# Patient Record
Sex: MALE | Race: OTHER | URBAN - NONMETROPOLITAN AREA
[De-identification: names, ages, dates, MRNs, and addresses within clinical notes are randomized per-mention and may not be internally consistent; named-entity substitution may affect disease eponyms.]

---

## 2017-09-19 NOTE — PROCEDURE NOTE: CLINICAL
PROCEDURE NOTE: Eylea #8 OD. Diagnosis: Diabetic Macular Edema. Prior to injection, risks/benefits/alternatives discussed including corneal abrasion, infection, loss of vision, hemorrhage, cataract, glaucoma, retinal tears or detachment. A written consent is on file, and the need for today’s injection was discussed and the patient is understanding and wishes to proceed. The entire vial of Eylea was drawn up into a syringe. The opened vial, remaining drug, and filtered needle were disposed of in a certified biohazard container. Betadine prep was performed. Topical anesthesia was induced with Alcaine. 4% lidocaine pledge. A lid speculum was used. A short 30g needle on a 1cc syringe was used with product that that had previously been prepared under sterile conditions. Injection site: 3-4 mm from the limbus. The used syringe/needle was transferred to a biohazard container. Lid speculum removed. Mask worn during procedure. Patient tolerated procedure well. Count fingers vision was verified. There were no complications. Patient was given the standard instruction sheet. Patient given office phone number/answering service number and advised to call immediately should there be loss of vision or pain, or should they have any other questions or concerns. Nish Coelho

## 2017-10-03 NOTE — PROCEDURE NOTE: CLINICAL
PROCEDURE NOTE: Eylea #6 OS. Diagnosis: Diabetic Macular Edema. Prep: Betadine Drops and Betadine Scrub. Prior to injection, risks/benefits/alternatives discussed including corneal abrasion, infection, loss of vision, hemorrhage, cataract, glaucoma, retinal tears or detachment. A written consent is on file, and the need for today’s injection was discussed and the patient is understanding and wishes to proceed. The entire vial of Eylea was drawn up into a syringe. The opened vial, remaining drug, and filtered needle were disposed of in a certified biohazard container. Betadine prep was performed. Topical anesthesia was induced with Alcaine. 4% lidocaine pledge. A lid speculum was used. A short 30g needle on a 1cc syringe was used with product that that had previously been prepared under sterile conditions. Injection site: 3-4 mm from the limbus. The used syringe/needle was transferred to a biohazard container. Lid speculum removed. Mask worn during procedure. Patient tolerated procedure well. Count fingers vision was verified. There were no complications. Patient was given the standard instruction sheet. Patient given office phone number/answering service number and advised to call immediately should there be loss of vision or pain, or should they have any other questions or concerns. Tono Richter PROCEDURE NOTE: Focal Laser, Retina #1 OD. Diagnosis: Diabetic Macular Edema. Anesthesia: Topical. Prior to focal laser, risks/benefits/alternatives to laser discussed including loss of vision and patient wished to proceed. Spot size: * um. Power: * mW. Number of pulses: *. Patient tolerated procedure well. There were no complications. Post procedure instructions given. Tono Richter

## 2017-10-03 NOTE — PATIENT DISCUSSION
Recc that patient have Focal Laser today for the diabetic Retinopathy. R & B discussed in detail. Pt elects to proceed. Insurance ok Per [employer]. Will keep scheduled Eylea # 9 on 10-17. Advised pt to call with any [facility] changes.

## 2017-10-03 NOTE — PATIENT DISCUSSION
Recommended Eylea # 6 injection today. The injection was given and tolerated well by patient. Post-injection instructions were reviewed and understood by the patient.

## 2017-11-07 NOTE — PROCEDURE NOTE: CLINICAL
PROCEDURE NOTE: Eylea #9 OD. Diagnosis: Diabetic Macular Edema. Prep: Betadine Drops and Betadine Scrub. Prior to injection, risks/benefits/alternatives discussed including corneal abrasion, infection, loss of vision, hemorrhage, cataract, glaucoma, retinal tears or detachment. A written consent is on file, and the need for today’s injection was discussed and the patient is understanding and wishes to proceed. The entire vial of Eylea was drawn up into a syringe. The opened vial, remaining drug, and filtered needle were disposed of in a certified biohazard container. Betadine prep was performed. Topical anesthesia was induced with Alcaine. 4% lidocaine pledge. A lid speculum was used. A short 30g needle on a 1cc syringe was used with product that that had previously been prepared under sterile conditions. Injection site: 3-4 mm from the limbus. The used syringe/needle was transferred to a biohazard container. Lid speculum removed. Mask worn during procedure. Patient tolerated procedure well. Count fingers vision was verified. There were no complications. Patient was given the standard instruction sheet. Patient given office phone number/answering service number and advised to call immediately should there be loss of vision or pain, or should they have any other questions or concerns. Tono Richter PROCEDURE NOTE: Eylea #7 OS. Diagnosis: Diabetic Macular Edema. Prep: Betadine Drops and Betadine Scrub. Prior to injection, risks/benefits/alternatives discussed including corneal abrasion, infection, loss of vision, hemorrhage, cataract, glaucoma, retinal tears or detachment. A written consent is on file, and the need for today’s injection was discussed and the patient is understanding and wishes to proceed. The entire vial of Eylea was drawn up into a syringe. The opened vial, remaining drug, and filtered needle were disposed of in a certified biohazard container. Betadine prep was performed. Topical anesthesia was induced with Alcaine. 4% lidocaine pledge. A lid speculum was used. A short 30g needle on a 1cc syringe was used with product that that had previously been prepared under sterile conditions. Injection site: 3-4 mm from the limbus. The used syringe/needle was transferred to a biohazard container. Lid speculum removed. Mask worn during procedure. Patient tolerated procedure well. Count fingers vision was verified. There were no complications. Patient was given the standard instruction sheet. Patient given office phone number/answering service number and advised to call immediately should there be loss of vision or pain, or should they have any other questions or concerns. Tono Richter

## 2017-12-06 NOTE — PATIENT DISCUSSION
Pt edu due to high tendency to return [facility] [address] Eylea # 8 today. Pt elects to proceed.

## 2017-12-06 NOTE — PROCEDURE NOTE: CLINICAL
PROCEDURE NOTE: Eylea #10 OD. Diagnosis: Diabetic Macular Edema. Prep: Betadine Drops and Betadine Scrub. Prior to injection, risks/benefits/alternatives discussed including corneal abrasion, infection, loss of vision, hemorrhage, cataract, glaucoma, retinal tears or detachment. A written consent is on file, and the need for today’s injection was discussed and the patient is understanding and wishes to proceed. The entire vial of Eylea was drawn up into a syringe. The opened vial, remaining drug, and filtered needle were disposed of in a certified biohazard container. Betadine prep was performed. Topical anesthesia was induced with Alcaine. 4% lidocaine pledge. A lid speculum was used. A short 30g needle on a 1cc syringe was used with product that that had previously been prepared under sterile conditions. Injection site: 3-4 mm from the limbus. The used syringe/needle was transferred to a biohazard container. Lid speculum removed. Mask worn during procedure. Patient tolerated procedure well. Count fingers vision was verified. There were no complications. Patient was given the standard instruction sheet. Patient given office phone number/answering service number and advised to call immediately should there be loss of vision or pain, or should they have any other questions or concerns. Onel Trippmanny PROCEDURE NOTE: Eylea #8 OS. Diagnosis: Diabetic Macular Edema. Prep: Betadine Drops and Betadine Scrub. Prior to injection, risks/benefits/alternatives discussed including corneal abrasion, infection, loss of vision, hemorrhage, cataract, glaucoma, retinal tears or detachment. A written consent is on file, and the need for today’s injection was discussed and the patient is understanding and wishes to proceed. The entire vial of Eylea was drawn up into a syringe. The opened vial, remaining drug, and filtered needle were disposed of in a certified biohazard container. Betadine prep was performed. Topical anesthesia was induced with Alcaine. 4% lidocaine pledge. A lid speculum was used. A short 30g needle on a 1cc syringe was used with product that that had previously been prepared under sterile conditions. Injection site: 3-4 mm from the limbus. The used syringe/needle was transferred to a biohazard container. Lid speculum removed. Mask worn during procedure. Patient tolerated procedure well. Count fingers vision was verified. There were no complications. Patient was given the standard instruction sheet. Patient given office phone number/answering service number and advised to call immediately should there be loss of vision or pain, or should they have any other questions or concerns. Onel Villareal

## 2017-12-20 NOTE — PROCEDURE NOTE: CLINICAL
PROCEDURE NOTE: Focal Laser, Retina #1 OS. Diagnosis: Diabetic Macular Edema. Prior to focal laser, risks/benefits/alternatives to laser discussed including loss of vision and patient wished to proceed. Spot size: 100 um. Power: 130 mW. Number of pulses: 40. Patient tolerated procedure well. There were no complications. Post procedure instructions given. Gay Nava

## 2017-12-20 NOTE — PATIENT DISCUSSION
recs focal laser #1 today dicussed risks, benefits and advantages pt elects to proceed with focal laser OS today. rtc as scheduled in Jan(5weeks) for [facility] [place] /eylea OU/OCT.

## 2018-01-09 NOTE — PATIENT DISCUSSION
Recommended injection of Eylea # 11. The injection was given and tolerated well by patient. Post-injection instructions were reviewed and understood by the patient.

## 2018-01-09 NOTE — PATIENT DISCUSSION
Recommended Eylea # 9 injection today. The injection was given and tolerated well by patient. Post-injection instructions were reviewed and understood by the patient.

## 2018-01-09 NOTE — PATIENT DISCUSSION
Pt edu slightly better. Has high tendency to return. Rec Adrian # 9 today. Pt elects to proceed. Will follow up in 4 weeks for BILATERAL TREATMENT ONLY PER TPB.

## 2018-01-09 NOTE — PATIENT DISCUSSION
Decision to treat was made based on today's clinical findings. Pt edu slightly worse today. Has high tendency to return. In 2 weeks also recc Focal Laser only for right eye. Then in 4 weeks will follow up for Bilateral TREATMENT ONLY PER TPB.

## 2018-01-09 NOTE — PROCEDURE NOTE: CLINICAL
PROCEDURE NOTE: Eylea #11 OD. Diagnosis: Diabetic Macular Edema. Prep: Betadine Drops and Betadine Scrub. Prior to injection, risks/benefits/alternatives discussed including corneal abrasion, infection, loss of vision, hemorrhage, cataract, glaucoma, retinal tears or detachment. A written consent is on file, and the need for today’s injection was discussed and the patient is understanding and wishes to proceed. The entire vial of Eylea was drawn up into a syringe. The opened vial, remaining drug, and filtered needle were disposed of in a certified biohazard container. Betadine prep was performed. Topical anesthesia was induced with Alcaine. 4% lidocaine pledge. A lid speculum was used. A short 30g needle on a 1cc syringe was used with product that that had previously been prepared under sterile conditions. Injection site: 3-4 mm from the limbus. The used syringe/needle was transferred to a biohazard container. Lid speculum removed. Mask worn during procedure. Patient tolerated procedure well. Count fingers vision was verified. There were no complications. Patient was given the standard instruction sheet. Patient given office phone number/answering service number and advised to call immediately should there be loss of vision or pain, or should they have any other questions or concerns. [facility] PROCEDURE NOTE: Eylea #9 OS. Diagnosis: Diabetic Macular Edema. Prep: Betadine Drops and Betadine Scrub. Prior to injection, risks/benefits/alternatives discussed including corneal abrasion, infection, loss of vision, hemorrhage, cataract, glaucoma, retinal tears or detachment. A written consent is on file, and the need for today’s injection was discussed and the patient is understanding and wishes to proceed. The entire vial of Eylea was drawn up into a syringe. The opened vial, remaining drug, and filtered needle were disposed of in a certified biohazard container. Betadine prep was performed. Topical anesthesia was induced with Alcaine. 4% lidocaine pledge. A lid speculum was used. A short 30g needle on a 1cc syringe was used with product that that had previously been prepared under sterile conditions. Injection site: 3-4 mm from the limbus. The used syringe/needle was transferred to a biohazard container. Lid speculum removed. Mask worn during procedure. Patient tolerated procedure well. Count fingers vision was verified. There were no complications. Patient was given the standard instruction sheet. Patient given office phone number/answering service number and advised to call immediately should there be loss of vision or pain, or should they have any other questions or concerns. Rosalia Bowman

## 2018-01-24 NOTE — PROCEDURE NOTE: CLINICAL
PROCEDURE NOTE: Focal Laser, Retina #2 OD. Diagnosis: Diabetic Macular Edema. Anesthesia: Topical. Prior to focal laser, risks/benefits/alternatives to laser discussed including loss of vision and patient wished to proceed. Spot size: 300 um. Power: 110 mW. Number of pulses: 20. Patient tolerated procedure well. There were no complications. Post procedure instructions given. Trevor Clemons

## 2018-01-24 NOTE — PATIENT DISCUSSION
recc Focal Laser only for right eye tx only today R/B/As disc with pt, opt elects to proceed. has appt scheduled in 2 weeks for Bilateral TREATMENT ONLY PER TPB.

## 2018-02-06 NOTE — PATIENT DISCUSSION
Recommended Eylea #10 injection today, BILATERAL. The patient elects to proceed. The injection was administered and tolerated well by patient with no complications. Post-injection instructions were reviewed and understood by the patient.

## 2018-02-06 NOTE — PROCEDURE NOTE: CLINICAL
PROCEDURE NOTE: Eylea #12 OD. Diagnosis: Diabetic Macular Edema. Prior to injection, risks/benefits/alternatives discussed including corneal abrasion, infection, loss of vision, hemorrhage, cataract, glaucoma, retinal tears or detachment. A written consent is on file, and the need for today’s injection was discussed and the patient is understanding and wishes to proceed. The entire vial of Eylea was drawn up into a syringe. The opened vial, remaining drug, and filtered needle were disposed of in a certified biohazard container. Betadine prep was performed. Topical anesthesia was induced with Alcaine. 4% lidocaine pledge. A lid speculum was used. A short 30g needle on a 1cc syringe was used with product that that had previously been prepared under sterile conditions. Injection site: 3-4 mm from the limbus. The used syringe/needle was transferred to a biohazard container. Lid speculum removed. Mask worn during procedure. Patient tolerated procedure well. Count fingers vision was verified. There were no complications. Patient was given the standard instruction sheet. Patient given office phone number/answering service number and advised to call immediately should there be loss of vision or pain, or should they have any other questions or concerns. Alexa Diego PROCEDURE NOTE: Eylea #10 OS. Diagnosis: Diabetic Macular Edema. Prior to injection, risks/benefits/alternatives discussed including corneal abrasion, infection, loss of vision, hemorrhage, cataract, glaucoma, retinal tears or detachment. A written consent is on file, and the need for today’s injection was discussed and the patient is understanding and wishes to proceed. The entire vial of Eylea was drawn up into a syringe. The opened vial, remaining drug, and filtered needle were disposed of in a certified biohazard container. Betadine prep was performed. Topical anesthesia was induced with Alcaine. 4% lidocaine pledge. A lid speculum was used. A short 30g needle on a 1cc syringe was used with product that that had previously been prepared under sterile conditions. Injection site: 3-4 mm from the limbus. The used syringe/needle was transferred to a biohazard container. Lid speculum removed. Mask worn during procedure. Patient tolerated procedure well. Count fingers vision was verified. There were no complications. Patient was given the standard instruction sheet. Patient given office phone number/answering service number and advised to call immediately should there be loss of vision or pain, or should they have any other questions or concerns. Alexa Diego

## 2018-02-06 NOTE — PATIENT DISCUSSION
Recommended Eylea #12 injection today, BILATERAL. The patient elects to proceed. The injection was administered and tolerated well by patient with no complications. Post-injection instructions were reviewed and understood by the patient.

## 2018-03-06 NOTE — PROCEDURE NOTE: CLINICAL
PROCEDURE NOTE: Eylea #13 OD. Diagnosis: Diabetic Macular Edema. Prep: Betadine Drops and Betadine Scrub. Prior to injection, risks/benefits/alternatives discussed including corneal abrasion, infection, loss of vision, hemorrhage, cataract, glaucoma, retinal tears or detachment. A written consent is on file, and the need for today’s injection was discussed and the patient is understanding and wishes to proceed. The entire vial of Eylea was drawn up into a syringe. The opened vial, remaining drug, and filtered needle were disposed of in a certified biohazard container. Betadine prep was performed. Topical anesthesia was induced with Alcaine. 4% lidocaine pledge. A lid speculum was used. A short 30g needle on a 1cc syringe was used with product that that had previously been prepared under sterile conditions. Injection site: 3-4 mm from the limbus. The used syringe/needle was transferred to a biohazard container. Lid speculum removed. Mask worn during procedure. Patient tolerated procedure well. Count fingers vision was verified. There were no complications. Patient was given the standard instruction sheet. Patient given office phone number/answering service number and advised to call immediately should there be loss of vision or pain, or should they have any other questions or concerns. Mishel Montoya PROCEDURE NOTE: Eylea #11 OS. Diagnosis: Diabetic Macular Edema. Prep: Betadine Drops and Betadine Scrub. Prior to injection, risks/benefits/alternatives discussed including corneal abrasion, infection, loss of vision, hemorrhage, cataract, glaucoma, retinal tears or detachment. A written consent is on file, and the need for today’s injection was discussed and the patient is understanding and wishes to proceed. The entire vial of Eylea was drawn up into a syringe. The opened vial, remaining drug, and filtered needle were disposed of in a certified biohazard container. Betadine prep was performed. Topical anesthesia was induced with Alcaine. 4% lidocaine pledge. A lid speculum was used. A short 30g needle on a 1cc syringe was used with product that that had previously been prepared under sterile conditions. Injection site: 3-4 mm from the limbus. The used syringe/needle was transferred to a biohazard container. Lid speculum removed. Mask worn during procedure. Patient tolerated procedure well. Count fingers vision was verified. There were no complications. Patient was given the standard instruction sheet. Patient given office phone number/answering service number and advised to call immediately should there be loss of vision or pain, or should they have any other questions or concerns. Mishel Montoya

## 2018-03-06 NOTE — PATIENT DISCUSSION
Recommended Eylea #13 injection today, BILATERAL. The patient elects to proceed. The injection was administered and tolerated well by patient with no complications. Post-injection instructions were reviewed and understood by the patient.

## 2018-03-06 NOTE — PATIENT DISCUSSION
Recommended Eylea #11 injection today, BILATERAL. The patient elects to proceed. The injection was administered and tolerated well by patient with no complications. Post-injection instructions were reviewed and understood by the patient.

## 2018-04-17 NOTE — PROCEDURE NOTE: CLINICAL
PROCEDURE NOTE: Eylea #14 OD. Diagnosis: Severe Nonproliferative Diabetic Retinopathy. Prep: Betadine Drops and Betadine Scrub. Prior to injection, risks/benefits/alternatives discussed including corneal abrasion, infection, loss of vision, hemorrhage, cataract, glaucoma, retinal tears or detachment. A written consent is on file, and the need for today’s injection was discussed and the patient is understanding and wishes to proceed. The entire vial of Eylea was drawn up into a syringe. The opened vial, remaining drug, and filtered needle were disposed of in a certified biohazard container. Betadine prep was performed. Topical anesthesia was induced with Alcaine. 4% lidocaine pledge. A lid speculum was used. A short 30g needle on a 1cc syringe was used with product that that had previously been prepared under sterile conditions. Injection site: 3-4 mm from the limbus. The used syringe/needle was transferred to a biohazard container. Lid speculum removed. Mask worn during procedure. Patient tolerated procedure well. Count fingers vision was verified. There were no complications. Patient was given the standard instruction sheet. Patient given office phone number/answering service number and advised to call immediately should there be loss of vision or pain, or should they have any other questions or concerns. [facility] PROCEDURE NOTE: Eylea #12 OS. Diagnosis: Severe Nonproliferative Diabetic Retinopathy. Prior to injection, risks/benefits/alternatives discussed including corneal abrasion, infection, loss of vision, hemorrhage, cataract, glaucoma, retinal tears or detachment. A written consent is on file, and the need for today’s injection was discussed and the patient is understanding and wishes to proceed. The entire vial of Eylea was drawn up into a syringe. The opened vial, remaining drug, and filtered needle were disposed of in a certified biohazard container. Betadine prep was performed. Topical anesthesia was induced with Alcaine. 4% lidocaine pledge. A lid speculum was used. A short 30g needle on a 1cc syringe was used with product that that had previously been prepared under sterile conditions. Injection site: 3-4 mm from the limbus. The used syringe/needle was transferred to a biohazard container. Lid speculum removed. Mask worn during procedure. Patient tolerated procedure well. Count fingers vision was verified. There were no complications. Patient was given the standard instruction sheet. Patient given office phone number/answering service number and advised to call immediately should there be loss of vision or pain, or should they have any other questions or concerns. Rosalia Bowman

## 2018-04-17 NOTE — PATIENT DISCUSSION
04/17/2018 (RETINA)- OCT only at the next visit. POSSIBLE Eylea injections OU at the next visit. Recommended continuing injection interval at 4-5 weeks.

## 2018-04-17 NOTE — PATIENT DISCUSSION
Recommended Eylea #14 injection today, BILATERAL. The patient elects to proceed. The injection was administered and tolerated well by patient with no complications. Post-injection instructions were reviewed and understood by the patient.

## 2018-05-15 NOTE — PATIENT DISCUSSION
Recommended Eylea #15 injection today, BILATERAL. The patient elects to proceed. The injection was administered and tolerated well by patient with no complications. Post-injection instructions were reviewed and understood by the patient.

## 2018-05-15 NOTE — PROCEDURE NOTE: CLINICAL
PROCEDURE NOTE: Eylea #15 OD. Diagnosis: Severe Nonproliferative Diabetic Retinopathy. Prior to injection, risks/benefits/alternatives discussed including corneal abrasion, infection, loss of vision, hemorrhage, cataract, glaucoma, retinal tears or detachment. A written consent is on file, and the need for today’s injection was discussed and the patient is understanding and wishes to proceed. The entire vial of Eylea was drawn up into a syringe. The opened vial, remaining drug, and filtered needle were disposed of in a certified biohazard container. Betadine prep was performed. Topical anesthesia was induced with Alcaine. 4% lidocaine pledge. A lid speculum was used. A short 30g needle on a 1cc syringe was used with product that that had previously been prepared under sterile conditions. Injection site: 3-4 mm from the limbus. The used syringe/needle was transferred to a biohazard container. Lid speculum removed. Mask worn during procedure. Patient tolerated procedure well. Count fingers vision was verified. There were no complications. Patient was given the standard instruction sheet. Patient given office phone number/answering service number and advised to call immediately should there be loss of vision or pain, or should they have any other questions or concerns. Claribel Chauhan PROCEDURE NOTE: Eylea #13 OS. Diagnosis: Severe Nonproliferative Diabetic Retinopathy. Prior to injection, risks/benefits/alternatives discussed including corneal abrasion, infection, loss of vision, hemorrhage, cataract, glaucoma, retinal tears or detachment. A written consent is on file, and the need for today’s injection was discussed and the patient is understanding and wishes to proceed. The entire vial of Eylea was drawn up into a syringe. The opened vial, remaining drug, and filtered needle were disposed of in a certified biohazard container. Betadine prep was performed. Topical anesthesia was induced with Alcaine. 4% lidocaine pledge. A lid speculum was used. A short 30g needle on a 1cc syringe was used with product that that had previously been prepared under sterile conditions. Injection site: 3-4 mm from the limbus. The used syringe/needle was transferred to a biohazard container. Lid speculum removed. Mask worn during procedure. Patient tolerated procedure well. Count fingers vision was verified. There were no complications. Patient was given the standard instruction sheet. Patient given office phone number/answering service number and advised to call immediately should there be loss of vision or pain, or should they have any other questions or concerns. Claribel Chauhan

## 2018-05-22 NOTE — PATIENT DISCUSSION
increased DME,  Ozurdex injection today OD as previously discussed at last appointment.  Patient consented to injection, injection given to patient and tolerated well.  S/S of endophthalmitis discussed with patient , patient to call with any changes in vision or S/S of endophthalmitis.

## 2018-05-22 NOTE — PROCEDURE NOTE: CLINICAL
PROCEDURE NOTE: Intravitreal Ozurdex #1 OD. Diagnosis: Diabetic Macular Edema. Anesthesia: Subconjunctival. Prep: Betadine Drops and Betadine Scrub. Prior to injection, risks/benefits/alternatives discussed including infection, loss of vision, hemorrhage, cataract, glaucoma, retinal tears or detachment and patient wished to proceed. After topical anesthesia, betadine prep was performed. After the Betadine prep, intravitreal injection of Ozurdex 0.7mg was given 3-4 mm from the limbus in the inferotemporal quadrant. A lid speculum was used. Intravitreal injection of Ozurdex 0.7mg was given. Injection site: 3-4 mm from the limbus. Lid speculum removed. Count fingers vision was verified. Patient tolerated procedure well. There were no complications. Count fingers or better vision was verified within 5 minutes of the intravitreal injection prior to the patient leaving the office. CRA perfusion confirmed. Post-op instructions given. Patient given office phone number/answering service number and advised to call immediately should there be an increase in floaters or redness, loss of vision or pain, or should they have any other questions or concerns. Rosalia Bowman

## 2018-05-22 NOTE — PATIENT DISCUSSION
Increased DME , Ozurdex injection OD today as previously discussed at last appointment.  Follow up in 4 weeks.

## 2019-04-02 NOTE — PROCEDURE NOTE: CLINICAL
PROCEDURE NOTE: Eylea 2mg OD. Diagnosis: Retinal Edema. Prior to injection, risks/benefits/alternatives discussed including corneal abrasion, infection, loss of vision, hemorrhage, cataract, glaucoma, retinal tears or detachment. A written consent is on file, and the need for today’s injection was discussed and the patient is understanding and wishes to proceed. The entire vial of Eylea was drawn up into a syringe. The opened vial, remaining drug, and filtered needle were disposed of in a certified biohazard container. Betadine prep was performed. Topical anesthesia was induced with Alcaine. 4% lidocaine pledge. A lid speculum was used. A short 30g needle on a 1cc syringe was used with product that that had previously been prepared under sterile conditions. Injection site: 3-4 mm from the limbus. The used syringe/needle was transferred to a biohazard container. Lid speculum removed. Mask worn during procedure. Patient tolerated procedure well. Count fingers vision was verified. There were no complications. Patient was given the standard instruction sheet. Patient given office phone number/answering service number and advised to call immediately should there be loss of vision or pain, or should they have any other questions or concerns. [facility] PROCEDURE NOTE: Eylea 2mg OS. Diagnosis: Diabetic Macular Edema. Prior to injection, risks/benefits/alternatives discussed including corneal abrasion, infection, loss of vision, hemorrhage, cataract, glaucoma, retinal tears or detachment. A written consent is on file, and the need for today’s injection was discussed and the patient is understanding and wishes to proceed. The entire vial of Eylea was drawn up into a syringe. The opened vial, remaining drug, and filtered needle were disposed of in a certified biohazard container. Betadine prep was performed. Topical anesthesia was induced with Alcaine. 4% lidocaine pledge. A lid speculum was used. A short 30g needle on a 1cc syringe was used with product that that had previously been prepared under sterile conditions. Injection site: 3-4 mm from the limbus. The used syringe/needle was transferred to a biohazard container. Lid speculum removed. Mask worn during procedure. Patient tolerated procedure well. Count fingers vision was verified. There were no complications. Patient was given the standard instruction sheet. Patient given office phone number/answering service number and advised to call immediately should there be loss of vision or pain, or should they have any other questions or concerns. Luca Garcia

## 2019-04-02 NOTE — PATIENT DISCUSSION
Recommended Eylea injection today. The injection was given and tolerated well by patient. Post-injection instructions were reviewed and understood by the patient.

## 2019-04-30 NOTE — PROCEDURE NOTE: CLINICAL
PROCEDURE NOTE: Eylea 2mg OD. Diagnosis: Diabetic Macular Edema. Prior to injection, risks/benefits/alternatives discussed including corneal abrasion, infection, loss of vision, hemorrhage, cataract, glaucoma, retinal tears or detachment. A written consent is on file, and the need for today’s injection was discussed and the patient is understanding and wishes to proceed. The entire vial of Eylea was drawn up into a syringe. The opened vial, remaining drug, and filtered needle were disposed of in a certified biohazard container. Betadine prep was performed. Topical anesthesia was induced with Alcaine. 4% lidocaine pledge. A lid speculum was used. A short 30g needle on a 1cc syringe was used with product that that had previously been prepared under sterile conditions. Injection site: 3-4 mm from the limbus. The used syringe/needle was transferred to a biohazard container. Lid speculum removed. Mask worn during procedure. Patient tolerated procedure well. Count fingers vision was verified. There were no complications. Patient was given the standard instruction sheet. Patient given office phone number/answering service number and advised to call immediately should there be loss of vision or pain, or should they have any other questions or concerns. Gay Nava PROCEDURE NOTE: Eylea 2mg OS. Diagnosis: Diabetic Macular Edema. Prior to injection, risks/benefits/alternatives discussed including corneal abrasion, infection, loss of vision, hemorrhage, cataract, glaucoma, retinal tears or detachment. A written consent is on file, and the need for today’s injection was discussed and the patient is understanding and wishes to proceed. The entire vial of Eylea was drawn up into a syringe. The opened vial, remaining drug, and filtered needle were disposed of in a certified biohazard container. Betadine prep was performed. Topical anesthesia was induced with Alcaine. 4% lidocaine pledge. A lid speculum was used. A short 30g needle on a 1cc syringe was used with product that that had previously been prepared under sterile conditions. Injection site: 3-4 mm from the limbus. The used syringe/needle was transferred to a biohazard container. Lid speculum removed. Mask worn during procedure. Patient tolerated procedure well. Count fingers vision was verified. There were no complications. Patient was given the standard instruction sheet. Patient given office phone number/answering service number and advised to call immediately should there be loss of vision or pain, or should they have any other questions or concerns. Gay Nava

## 2019-05-28 NOTE — PROCEDURE NOTE: CLINICAL
PROCEDURE NOTE: Eylea 2mg OD. Diagnosis: Diabetic Macular Edema. Prior to injection, risks/benefits/alternatives discussed including corneal abrasion, infection, loss of vision, hemorrhage, cataract, glaucoma, retinal tears or detachment. A written consent is on file, and the need for today’s injection was discussed and the patient is understanding and wishes to proceed. The entire vial of Eylea was drawn up into a syringe. The opened vial, remaining drug, and filtered needle were disposed of in a certified biohazard container. Betadine prep was performed. Topical anesthesia was induced with Alcaine. 4% lidocaine pledge. A lid speculum was used. A short 30g needle on a 1cc syringe was used with product that that had previously been prepared under sterile conditions. Injection site: 3-4 mm from the limbus. The used syringe/needle was transferred to a biohazard container. Lid speculum removed. Mask worn during procedure. Patient tolerated procedure well. Count fingers vision was verified. There were no complications. Patient was given the standard instruction sheet. Patient given office phone number/answering service number and advised to call immediately should there be loss of vision or pain, or should they have any other questions or concerns. Alexagi Diego PROCEDURE NOTE: Eylea 2mg OS. Diagnosis: Diabetic Macular Edema. Prior to injection, risks/benefits/alternatives discussed including corneal abrasion, infection, loss of vision, hemorrhage, cataract, glaucoma, retinal tears or detachment. A written consent is on file, and the need for today’s injection was discussed and the patient is understanding and wishes to proceed. The entire vial of Eylea was drawn up into a syringe. The opened vial, remaining drug, and filtered needle were disposed of in a certified biohazard container. Betadine prep was performed. Topical anesthesia was induced with Alcaine. 4% lidocaine pledge. A lid speculum was used. A short 30g needle on a 1cc syringe was used with product that that had previously been prepared under sterile conditions. Injection site: 3-4 mm from the limbus. The used syringe/needle was transferred to a biohazard container. Lid speculum removed. Mask worn during procedure. Patient tolerated procedure well. Count fingers vision was verified. There were no complications. Patient was given the standard instruction sheet. Patient given office phone number/answering service number and advised to call immediately should there be loss of vision or pain, or should they have any other questions or concerns. Alexa Diego

## 2019-05-28 NOTE — PATIENT DISCUSSION
Diet for Vomiting and Diarrhea (Infant/Toddler)  Vomiting and diarrhea are common in babies and young children. They can quickly lose too much fluid and become dehydrated. This is the loss of too much water and minerals from the body. This can be serious and even life-threatening. When this occurs, body fluids must be replaced. This is done by giving small amounts of liquids often.  For babies, breast milk or formula is the best fluid. Breast milk can help reduce how serious the diarrhea is. But if your child shows signs of dehydration, the doctor may tell you to use an oral rehydration solution. Oral rehydration solution can replace lost minerals called electrolytes. Oral rehydration solution can be used in addition to breast or bottle feedings. Oral rehydration solution may also reduce vomiting and diarrhea. You can buy oral rehydration solution at grocery stores and drug stores without a prescription.   In cases of severe dehydration or vomiting, a child may need to go to a hospital to have intravenous (IV) fluids.  Giving liquids and feeding  For breast or formula feedings:  · Continue the breast or formula feedings. Do this unless your doctor says otherwise.  · If you use formula, your doctor may tell you to try a different kind of formula. A common cause of vomiting in newborns is a problem with formula.  · Give your baby short, frequent feedings. Feed every 30 minutes for 5 to 10 minutes at a time over a period of 2 to 3 hours. This will help give your baby more fluids.  · If vomiting or diarrhea does not stop, your doctor may tell you to give a formula with no lactose, or low lactose. Lactose is a milk sugar that can be hard to digest. Follow the doctor’s advice about what type of formula to give your baby.  If using oral rehydration solution:  · Follow your doctor’s instructions when giving the solution to your baby. Oral rehydration solution may be alternated with breast or formula feedings.  · Use only  Asymptomatic, no treatment needed. prepared, purchased oral rehydration solution. Don't make your own solution.  · Give your baby short, frequent feedings. Feed every 30 minutes for 2 to 3 hours. This will help replace lost electrolytes.  · If vomiting or diarrhea gets better after 2 to 3 hours, you can stop the oral rehydration solution. Resume breast milk or full-strength formula for all feedings.  For children on solid foods:  · Follow the diet your doctor advises.  · If desired and tolerated, your child may eat regular food.  · If unable to eat regular food, your child can drink clear liquids such as water, or suck on ice cubes. Do not give high-sugar fluids such as juice or soda. Give small amounts of food and drink often.  · If clear liquids are tolerated, slowly increase the amount. Alternate these fluids with oral rehydration solution as your doctor advises.  · Your child can start a regular diet 12 to 24 hours after diarrhea or vomiting has stopped. Continue to give plenty of clear liquids.  Follow-up care  Follow up with your child’s health care provider as advised. If a stool sample was taken or cultures were done, call the health care provider for the results as instructed.  Call 911  Call 911 if your child has any of these symptoms:  · Trouble breathing  · Confusion  · Extreme drowsiness or trouble walking  · Loss of consciousness  · Rapid heart rate  · Stiff neck  · Seizure  When to seek medical advice  Call your child’s health care provider right away if any of these occur:  · Abdominal pain that gets worse  · Constant lower right abdominal pain  · Repeated vomiting after the first two hours on liquids  · Occasional vomiting for more than 24 hours  · Continued severe diarrhea for more than 24 hours  · Blood in vomit or stool (black or red color)  · Refusal to drink or feed  · Dark urine or no urine for 8 hours, no tears when crying, sunken eyes, or dry mouth  · Fussiness or crying that cannot be soothed  · Unusual drowsiness  · New  rash  · More than 8 diarrhea stools within 8 hours  · Diarrhea lasts more than 1 week on antibiotics  · A child younger than 12 weeks has a fever of 100.4°F (38°C) or higher  · Fever of 101.4°F (38.5°C) or higher that doesn’t get lower with medicine  · A child younger than 2 years has fever for more than 24 hours  · A child 2 years or older has a fever for more than 3 days  · A child of any age has repeated fevers above 104°F (40°C)    © 2199-5788 [employer]. [address] [place] [id number]. All rights reserved. This information is not intended as a substitute for professional medical care. Always follow your healthcare professional's instructions.

## 2019-06-24 NOTE — PATIENT DISCUSSION
PA increase to 6x a day. Viga 4x a day. Ilevro. Cont with Combigan BID OD for now. on wednesday start on QID OD for PA with taper.

## 2019-08-13 NOTE — PATIENT DISCUSSION
Increased DME, Worse. Based on today’s exam, diagnostic studies, and/or review of records, the determination was made for treatment today. Will do a series of 3 with today being 1 of 3. Have Pt return in 1 month for repeat injections.

## 2019-08-13 NOTE — PROCEDURE NOTE: CLINICAL
PROCEDURE NOTE: Eylea 2mg OD. Diagnosis: Diabetic Macular Edema. Prior to injection, risks/benefits/alternatives discussed including corneal abrasion, infection, loss of vision, hemorrhage, cataract, glaucoma, retinal tears or detachment. A written consent is on file, and the need for today’s injection was discussed and the patient is understanding and wishes to proceed. The entire vial of Eylea was drawn up into a syringe. The opened vial, remaining drug, and filtered needle were disposed of in a certified biohazard container. Betadine prep was performed. Topical anesthesia was induced with Alcaine. 4% lidocaine pledge. A lid speculum was used. A short 30g needle on a 1cc syringe was used with product that that had previously been prepared under sterile conditions. Injection site: 3-4 mm from the limbus. The used syringe/needle was transferred to a biohazard container. Lid speculum removed. Mask worn during procedure. Patient tolerated procedure well. Count fingers vision was verified. There were no complications. Patient was given the standard instruction sheet. Patient given office phone number/answering service number and advised to call immediately should there be loss of vision or pain, or should they have any other questions or concerns. Nish Coelho PROCEDURE NOTE: Eylea 2mg OS. Diagnosis: Diabetic Macular Edema. Prior to injection, risks/benefits/alternatives discussed including corneal abrasion, infection, loss of vision, hemorrhage, cataract, glaucoma, retinal tears or detachment. A written consent is on file, and the need for today’s injection was discussed and the patient is understanding and wishes to proceed. The entire vial of Eylea was drawn up into a syringe. The opened vial, remaining drug, and filtered needle were disposed of in a certified biohazard container. Betadine prep was performed. Topical anesthesia was induced with Alcaine. 4% lidocaine pledge. A lid speculum was used. A short 30g needle on a 1cc syringe was used with product that that had previously been prepared under sterile conditions. Injection site: 3-4 mm from the limbus. The used syringe/needle was transferred to a biohazard container. Lid speculum removed. Mask worn during procedure. Patient tolerated procedure well. Count fingers vision was verified. There were no complications. Patient was given the standard instruction sheet. Patient given office phone number/answering service number and advised to call immediately should there be loss of vision or pain, or should they have any other questions or concerns. Nish Coelho

## 2019-08-13 NOTE — PATIENT DISCUSSION
Discussed the importance of blood sugar and blood pressure control in the prevention of ocular complications.

## 2019-09-10 NOTE — PROCEDURE NOTE: CLINICAL
PROCEDURE NOTE: Eylea 2mg OD. Diagnosis: Diabetic Macular Edema. Prior to injection, risks/benefits/alternatives discussed including corneal abrasion, infection, loss of vision, hemorrhage, cataract, glaucoma, retinal tears or detachment. A written consent is on file, and the need for today’s injection was discussed and the patient is understanding and wishes to proceed. The entire vial of Eylea was drawn up into a syringe. The opened vial, remaining drug, and filtered needle were disposed of in a certified biohazard container. Betadine prep was performed. Topical anesthesia was induced with Alcaine. 4% lidocaine pledge. A lid speculum was used. A short 30g needle on a 1cc syringe was used with product that that had previously been prepared under sterile conditions. Injection site: 3-4 mm from the limbus. The used syringe/needle was transferred to a biohazard container. Lid speculum removed. Mask worn during procedure. Patient tolerated procedure well. Count fingers vision was verified. There were no complications. Patient was given the standard instruction sheet. Patient given office phone number/answering service number and advised to call immediately should there be loss of vision or pain, or should they have any other questions or concerns. Everettegabino Duran PROCEDURE NOTE: Eylea 2mg OS. Diagnosis: Diabetic Macular Edema. Prior to injection, risks/benefits/alternatives discussed including corneal abrasion, infection, loss of vision, hemorrhage, cataract, glaucoma, retinal tears or detachment. A written consent is on file, and the need for today’s injection was discussed and the patient is understanding and wishes to proceed. The entire vial of Eylea was drawn up into a syringe. The opened vial, remaining drug, and filtered needle were disposed of in a certified biohazard container. Betadine prep was performed. Topical anesthesia was induced with Alcaine. 4% lidocaine pledge. A lid speculum was used. A short 30g needle on a 1cc syringe was used with product that that had previously been prepared under sterile conditions. Injection site: 3-4 mm from the limbus. The used syringe/needle was transferred to a biohazard container. Lid speculum removed. Mask worn during procedure. Patient tolerated procedure well. Count fingers vision was verified. There were no complications. Patient was given the standard instruction sheet. Patient given office phone number/answering service number and advised to call immediately should there be loss of vision or pain, or should they have any other questions or concerns. Lamberto Duran

## 2019-09-10 NOTE — PATIENT DISCUSSION
Decreased DME, Slightly Improved, Will continue with the series, today being 2 of 3. Pt to  return in 1 month for repeat injections.

## 2019-09-10 NOTE — PATIENT DISCUSSION
Decreased DME, Slightly Improved. Will do a series of 3 with today being 2 of 3. Have Pt return in 1 month for repeat injections.

## 2019-10-08 NOTE — PROCEDURE NOTE: CLINICAL
PROCEDURE NOTE: Eylea 2mg OD. Diagnosis: Diabetic Macular Edema. Prior to injection, risks/benefits/alternatives discussed including corneal abrasion, infection, loss of vision, hemorrhage, cataract, glaucoma, retinal tears or detachment. A written consent is on file, and the need for today’s injection was discussed and the patient is understanding and wishes to proceed. The entire vial of Eylea was drawn up into a syringe. The opened vial, remaining drug, and filtered needle were disposed of in a certified biohazard container. Betadine prep was performed. Topical anesthesia was induced with Alcaine. 4% lidocaine pledge. A lid speculum was used. A short 30g needle on a 1cc syringe was used with product that that had previously been prepared under sterile conditions. Injection site: 3-4 mm from the limbus. The used syringe/needle was transferred to a biohazard container. Lid speculum removed. Mask worn during procedure. Patient tolerated procedure well. Count fingers vision was verified. There were no complications. Patient was given the standard instruction sheet. Patient given office phone number/answering service number and advised to call immediately should there be loss of vision or pain, or should they have any other questions or concerns. Jori Avila PROCEDURE NOTE: Eylea 2mg OS. Diagnosis: Diabetic Macular Edema. Prior to injection, risks/benefits/alternatives discussed including corneal abrasion, infection, loss of vision, hemorrhage, cataract, glaucoma, retinal tears or detachment. A written consent is on file, and the need for today’s injection was discussed and the patient is understanding and wishes to proceed. The entire vial of Eylea was drawn up into a syringe. The opened vial, remaining drug, and filtered needle were disposed of in a certified biohazard container. Betadine prep was performed. Topical anesthesia was induced with Alcaine. 4% lidocaine pledge. A lid speculum was used. A short 30g needle on a 1cc syringe was used with product that that had previously been prepared under sterile conditions. Injection site: 3-4 mm from the limbus. The used syringe/needle was transferred to a biohazard container. Lid speculum removed. Mask worn during procedure. Patient tolerated procedure well. Count fingers vision was verified. There were no complications. Patient was given the standard instruction sheet. Patient given office phone number/answering service number and advised to call immediately should there be loss of vision or pain, or should they have any other questions or concerns. Jori Avila

## 2019-10-11 NOTE — PROCEDURE NOTE: CLINICAL
PROCEDURE NOTE: Focal Laser, Retina Micropulse OD. Diagnosis: Diabetic Macular Edema. Anesthesia: Topical. Prior to focal laser, risks/benefits/alternatives to laser discussed including loss of vision and patient wished to proceed. Spot size: 100 um. Power: 200 mW. Number of pulses: 11. Patient tolerated procedure well. There were no complications. Post procedure instructions given. Lorin Osborne

## 2019-10-18 NOTE — PROCEDURE NOTE: CLINICAL
PROCEDURE NOTE: Focal Laser, Retina OS. Diagnosis: Diabetic Macular Edema. Anesthesia: Topical. Prior to focal laser, risks/benefits/alternatives to laser discussed including loss of vision and patient wished to proceed. Spot size: 50 um. Power: 100 mW. Number of pulses: 4. Patient tolerated procedure well. There were no complications. Post procedure instructions given. Deidra Villa

## 2019-11-05 NOTE — PATIENT DISCUSSION
Decreased DME, improved. Pt here for [address] only.  Based on today’s exam, diagnostic studies, and/or review of records, the determination was made for treatment today.

## 2019-11-05 NOTE — PROCEDURE NOTE: CLINICAL
PROCEDURE NOTE: Eylea 2mg OD. Diagnosis: Diabetic Macular Edema. Prior to injection, risks/benefits/alternatives discussed including corneal abrasion, infection, loss of vision, hemorrhage, cataract, glaucoma, retinal tears or detachment. A written consent is on file, and the need for today’s injection was discussed and the patient is understanding and wishes to proceed. The entire vial of Eylea was drawn up into a syringe. The opened vial, remaining drug, and filtered needle were disposed of in a certified biohazard container. Betadine prep was performed. Topical anesthesia was induced with Alcaine. 4% lidocaine pledge. A lid speculum was used. A short 30g needle on a 1cc syringe was used with product that that had previously been prepared under sterile conditions. Injection site: 3-4 mm from the limbus. The used syringe/needle was transferred to a biohazard container. Lid speculum removed. Mask worn during procedure. Patient tolerated procedure well. Count fingers vision was verified. There were no complications. Patient was given the standard instruction sheet. Patient given office phone number/answering service number and advised to call immediately should there be loss of vision or pain, or should they have any other questions or concerns. Isidra Khanna PROCEDURE NOTE: Eylea 2mg OS. Diagnosis: Diabetic Macular Edema. Prior to injection, risks/benefits/alternatives discussed including corneal abrasion, infection, loss of vision, hemorrhage, cataract, glaucoma, retinal tears or detachment. A written consent is on file, and the need for today’s injection was discussed and the patient is understanding and wishes to proceed. The entire vial of Eylea was drawn up into a syringe. The opened vial, remaining drug, and filtered needle were disposed of in a certified biohazard container. Betadine prep was performed. Topical anesthesia was induced with Alcaine. 4% lidocaine pledge. A lid speculum was used. A short 30g needle on a 1cc syringe was used with product that that had previously been prepared under sterile conditions. Injection site: 3-4 mm from the limbus. The used syringe/needle was transferred to a biohazard container. Lid speculum removed. Mask worn during procedure. Patient tolerated procedure well. Count fingers vision was verified. There were no complications. Patient was given the standard instruction sheet. Patient given office phone number/answering service number and advised to call immediately should there be loss of vision or pain, or should they have any other questions or concerns. Isidra Khanna

## 2019-12-10 NOTE — PATIENT DISCUSSION
Slight Decreased DME, improved. Based on today’s exam, diagnostic studies, and/or review of records, the determination was made for treatment today.

## 2019-12-10 NOTE — PROCEDURE NOTE: CLINICAL
PROCEDURE NOTE: Eylea 2mg OD. Diagnosis: Diabetic Macular Edema. Prior to injection, risks/benefits/alternatives discussed including corneal abrasion, infection, loss of vision, hemorrhage, cataract, glaucoma, retinal tears or detachment. A written consent is on file, and the need for today’s injection was discussed and the patient is understanding and wishes to proceed. The entire vial of Eylea was drawn up into a syringe. The opened vial, remaining drug, and filtered needle were disposed of in a certified biohazard container. Betadine prep was performed. Topical anesthesia was induced with Alcaine. 4% lidocaine pledge. A lid speculum was used. A short 30g needle on a 1cc syringe was used with product that that had previously been prepared under sterile conditions. Injection site: 3-4 mm from the limbus. The used syringe/needle was transferred to a biohazard container. Lid speculum removed. Mask worn during procedure. Patient tolerated procedure well. Count fingers vision was verified. There were no complications. Patient was given the standard instruction sheet. Patient given office phone number/answering service number and advised to call immediately should there be loss of vision or pain, or should they have any other questions or concerns. Sheri Mdeley PROCEDURE NOTE: Eylea 2mg OS. Diagnosis: Diabetic Macular Edema. Prior to injection, risks/benefits/alternatives discussed including corneal abrasion, infection, loss of vision, hemorrhage, cataract, glaucoma, retinal tears or detachment. A written consent is on file, and the need for today’s injection was discussed and the patient is understanding and wishes to proceed. The entire vial of Eylea was drawn up into a syringe. The opened vial, remaining drug, and filtered needle were disposed of in a certified biohazard container. Betadine prep was performed. Topical anesthesia was induced with Alcaine. 4% lidocaine pledge. A lid speculum was used. A short 30g needle on a 1cc syringe was used with product that that had previously been prepared under sterile conditions. Injection site: 3-4 mm from the limbus. The used syringe/needle was transferred to a biohazard container. Lid speculum removed. Mask worn during procedure. Patient tolerated procedure well. Count fingers vision was verified. There were no complications. Patient was given the standard instruction sheet. Patient given office phone number/answering service number and advised to call immediately should there be loss of vision or pain, or should they have any other questions or concerns. Sheri Medley

## 2020-01-21 NOTE — PATIENT DISCUSSION
Slightly increased DME, worse. .  Will add oxurdex in 1W, Based on today’s exam, diagnostic studies, and/or review of records, the determination was made for treatment today.

## 2020-01-21 NOTE — PROCEDURE NOTE: CLINICAL
PROCEDURE NOTE: Eylea 2mg OD. Diagnosis: Diabetic Macular Edema. Prior to injection, risks/benefits/alternatives discussed including corneal abrasion, infection, loss of vision, hemorrhage, cataract, glaucoma, retinal tears or detachment. A written consent is on file, and the need for today’s injection was discussed and the patient is understanding and wishes to proceed. The entire vial of Eylea was drawn up into a syringe. The opened vial, remaining drug, and filtered needle were disposed of in a certified biohazard container. Betadine prep was performed. Topical anesthesia was induced with Alcaine. 4% lidocaine pledge. A lid speculum was used. A short 30g needle on a 1cc syringe was used with product that that had previously been prepared under sterile conditions. Injection site: 3-4 mm from the limbus. The used syringe/needle was transferred to a biohazard container. Lid speculum removed. Mask worn during procedure. Patient tolerated procedure well. Count fingers vision was verified. There were no complications. Patient was given the standard instruction sheet. Patient given office phone number/answering service number and advised to call immediately should there be loss of vision or pain, or should they have any other questions or concerns. [facility] PROCEDURE NOTE: Eylea 2mg OS. Diagnosis: Diabetic Macular Edema. Prior to injection, risks/benefits/alternatives discussed including corneal abrasion, infection, loss of vision, hemorrhage, cataract, glaucoma, retinal tears or detachment. A written consent is on file, and the need for today’s injection was discussed and the patient is understanding and wishes to proceed. The entire vial of Eylea was drawn up into a syringe. The opened vial, remaining drug, and filtered needle were disposed of in a certified biohazard container. Betadine prep was performed. Topical anesthesia was induced with Alcaine. 4% lidocaine pledge. A lid speculum was used. A short 30g needle on a 1cc syringe was used with product that that had previously been prepared under sterile conditions. Injection site: 3-4 mm from the limbus. The used syringe/needle was transferred to a biohazard container. Lid speculum removed. Mask worn during procedure. Patient tolerated procedure well. Count fingers vision was verified. There were no complications. Patient was given the standard instruction sheet. Patient given office phone number/answering service number and advised to call immediately should there be loss of vision or pain, or should they have any other questions or concerns. Nisreen Rogers

## 2020-01-28 NOTE — PROCEDURE NOTE: CLINICAL
PROCEDURE NOTE: Intravitreal Ozurdex OD. Diagnosis: Diabetic Macular Edema. Prior to injection, risks/benefits/alternatives discussed including infection, loss of vision, hemorrhage, cataract, glaucoma, retinal tears or detachment and patient wished to proceed. After topical anesthesia, betadine prep was performed. After the Betadine prep, intravitreal injection of Ozurdex 0.7mg was given 3-4 mm from the limbus in the inferotemporal quadrant. A lid speculum was used. Intravitreal injection of Ozurdex 0.7mg was given. Injection site: 3-4 mm from the limbus. Lid speculum removed. Count fingers vision was verified. Patient tolerated procedure well. There were no complications. Count fingers or better vision was verified within 5 minutes of the intravitreal injection prior to the patient leaving the office. CRA perfusion confirmed. Post-op instructions given. Patient given office phone number/answering service number and advised to call immediately should there be an increase in floaters or redness, loss of vision or pain, or should they have any other questions or concerns. Sheri Medley

## 2020-01-28 NOTE — PATIENT DISCUSSION
Monitor. Reason For Visit  DOMENIC RODRIGUEZ is an established patient here today for a chief complaint of body aches and pains . Patient here for 2 month check up and medication management.   :  services not used.   DOMENIC RODRIGUEZ was offered a chaperone, but declined. She is unaccompanied.        [facility] not using alcohol, tobacco use and provided intervention and counseling in regards to tobacco use.      History of Present Illness  Patient is 57 years old female who is here today follow-up on multiple medical problems including chronic low back pain, hypertension hyperlipidemia COPD  Patient continues follow-up with pain clinic and seen pain psychologist on a weekly basis  She tries to stretch her hydrocodone prescription longer than a month. She says that he remains quite active compared to a few years ago. She has a nerve stimulator which helps her with the pain  Psychiatrist sent her to neurologist for evaluation of resting tremor  COPD has been stable on current inhalers,  She complains of left lower quadrant abdominal pain, in the site where previously she had ventral hernia repair. She reports chronic constipation managed well with MiraLAX. Pain is mild. Usually does not wake her up. Colonoscopy was done 2 years ago only showed sigmoid polyp.      Review of Systems    Const: no fever, not feeling tired, no anorexia and no recent weight loss.   Eyes: Normal.   ENT: Normal.   CV: no chest pain, no palpitations and no lightheadedness.   Resp: expressed as feeling short of breath, but no cough, no hemoptysis and no wheezing.   GI: abdominal pain, but no nausea, no vomiting, no diarrhea and no constipation.   : Normal.   Musc: joint pain and back pain.   Neuro: Normal.   Psych: anxiety, depression and insomnia.   Skin: no skin wound.       Allergies  No Known Drug Allergies    Current Meds   1. Advair Diskus 500-50 MCG/DOSE Inhalation Aerosol Powder Breath Activated; INHALE 1   DOSE BY  MOUTH TWICE DAILY. RINSE MOUTH AFTER USE;   Therapy: 09Jun2014 to (Last Rx:[id number])  Requested for: 24Ckd7897 Ordered   2. Atorvastatin Calcium 80 MG Oral Tablet; TAKE ONE TABLET BY MOUTH AT BEDTIME;   Therapy: 27Nov2013 to (Evaluate:83Mon1137)  Requested for: 88Grz4579; Last   Rx:[id number] Ordered   3. Calcium-Vitamin D 600-200 MG-UNIT Oral Tablet; 1 caps bid;   Therapy: 07Jan2015 to (Last Rx:07Jan2015)  Requested for: 07Jan2015 Ordered   4. Cyclobenzaprine HCl - 10 MG Oral Tablet; TAKE 1 TABLET BY MOUTH 3 TIMES DAILY AS   NEEDED;   Therapy: 70Mhp5285 to (Evaluate:18Nov2018)  Requested for: 60Tqf2106; Last   Rx:[id number] Ordered   5. Diclofenac Sodium 1 % Transdermal Gel; APPLY TO LOWER EXTREMITIES, 4 GM OF   GEL TO AFFECTED AREA 4 TIMES DAILY.  DO NOT APPLY MORE THAN 16 GM   DAILY TO ANY ONE AFFECTED JOINT;   Therapy: 09Jun2014 to (Last Rx:11Jun2018)  Requested for: 11Jun2018 Ordered   6. Diclofenac Sodium 50 MG Oral Tablet Delayed Release; TAKE 1 TABLET BY MOUTH   EVERY 12 HOURS;   Therapy: 21Wfy0308 to (Evaluate:30Jan2019)  Requested for: 02Oct2018; Last   Rx:[id number] Ordered   7. Fenofibrate Micronized 134 MG Oral Capsule; TAKE ONE CAPSULE BY MOUTH EVERY   DAY;   Therapy: 01Ett4073 to (Evaluate:05Jun2018)  Requested for: 87Oif1605; Last   Rx:[id number] Ordered   8. Gabapentin 800 MG Oral Tablet; TAKE 1 TABLET BY MOUTH 4 TIMES A DAY;   Therapy: 02Nov2015 to (Last Rx:11Jun2018)  Requested for: 11Jun2018 Ordered   9. Hydrocodone-Acetaminophen  MG Oral Tablet; TAKE 1 TO 2 TABLETS EVERY 4   TO 6 HOURS FOR BREAKTHROUGH PAIN;   Therapy: 59Gnf2393 to (Evaluate:25Oct2018); Last Rx:10Oct2018 Ordered   10. Incruse Ellipta 62.5 MCG/INH Inhalation Aerosol Powder Breath Activated; USE 1    INHALATION ONCE DAILY;    Therapy: 53Eor2833 to (Evaluate:95Imr3457)  Requested for: 27Buj2367; Last    Rx:[id number] Ordered   11. LamoTRIgine 100 MG Oral Tablet;    Therapy: 31Jan2018 to Recorded   12. Lithium Carbonate 300 MG  Oral Capsule; TAKE 1 CAPSULE 3 TIMES DAILY;    Therapy: 07Fqv3632 to (Evaluate:07Jan2016); Last Rx:[id number] Ordered   13. Polyethylene Glycol 3350 Oral Powder; TAKE 17GRAMS DAILY IN WATER;    Therapy: 10Oct2017 to (Evaluate:19Nov2018)  Requested for: 96Kzr2923; Last    Rx:[id number] Ordered   14. Propranolol HCl - 20 MG Oral Tablet; TAKE ONE TABLET BY MOUTH TWICE A DAY;    Therapy: 24Jan2014 to (Evaluate:07Jan2019)  Requested for: 86Djg4468; Last    Rx:[id number] Ordered   15. ROPINIRole HCl - 5 MG Oral Tablet; TAKE 1 TABLET BY MOUTH AT BEDTIME;    Therapy: 45Spd7509 to (Evaluate:25Nov2018)  Requested for: 36Hse9429; Last    Rx:[id number] Ordered   16. Venlafaxine HCl  MG Oral Tablet Extended Release 24 Hour; 1 PILL PO DAILY;    Therapy: 44Uyc4027 to (Last Rx:[id number])  Requested for: 07Unc9718 Ordered   17. Venlafaxine HCl  MG Oral Tablet Extended Release 24 Hour; TAKE 1 TABLET BY    MOUTH EVERY DAY;    Therapy: 71Icw8529 to (Evaluate:20Ulo1682)  Requested for: 31Dtn5818; Last    Rx:[id number] Ordered   18. Venlafaxine HCl ER 75 MG Oral Capsule Extended Release 24 Hour; TAKE 1 CAPSULE    BY MOUTH EVERY MORNING;    Therapy: 06Upu2657 to (Evaluate:04Apr2018)  Requested for: 52Xwa8725; Last    Rx:[id number] Ordered   19. Ventolin  (90 Base) MCG/ACT Inhalation Aerosol Solution; TAKE 1 PUFF BY    MOUTH EVERY 4 HOURS AS NEEDED;    Therapy: 05Nov2014 to (Last Rx:[id number])  Requested for: 79Mxk5724 Ordered   20. 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 , currently await for results of bronchial wash out,      question of MAC infection, biopsy negative for malignancy  Need for influenza vaccination (Z23)  Pain syndrome, chronic (G89.4)  Paronychia of finger of right hand (L03.011)  Postmenopausal (Z78.0)  Preoperative clearance (Z01.818)  Status post hernia repair (Z98.890,Z87.19)  Ventral hernia without obstruction or gangrene (K43.9)  Vitamin D deficiency disease (E55.9)    Surgical History  History of Laminectomy Cervical  History of Laminectomy Lumbar  History of Total Abdominal Hysterectomy With Removal Of Ovary(S)    Family History  Mother   Family history of breast cancer (Z80.3)    Social History  Current every day smoker (F17.200)  No alcohol use    Vitals  Signs   Recorded: 15Oct2018 10:23AM   Height: 5 ft 6 in  Weight: 138 lb 14.24 oz  BMI Calculated: 22.42  BSA Calculated: 1.71  Systolic: 114, LUE, Sitting  Diastolic: 76, LUE, Sitting  Temperature: 97.8 F, Temporal  Heart Rate: 62, L DP  Pulse Quality: Normal, L DP  Respiration Quality: Normal  Respiration: 16  O2 Saturation: 99  Pain Scale: 08    Physical Exam  Constitutional: alert, in no acute distress and current vital signs reviewed . patient was looking older for her age. She was using a walker for ambulation.   Head and Face: no tenderness of facial sinuses.   Neck: thyroid not enlarged.   Lymphatic: no lymphadenopathy.   Pulmonary: no respiratory distress and no accessory muscle use. diminished but clear bilaterally.   Cardiovascular: normal rate, no murmurs were heard, regular rhythm, normal S1 and normal S2. no right  carotid bruit right dorsalis pedis 1+ no left carotid bruit left dorsalis pedis 1+ edema was not present in the lower extremities.   Abdomen: soft, nondistended, normal bowel sounds and no abdominal mass . scar LLQ patient had mild discomfort on the palpation left lower quadrant patient. no hepatomegaly and no splenomegaly.   Musculoskeletal: Low back pain present on light palpation, patient was using a walker formulation  Tenderness on palpation of the implantable neurostimulator right lower back   Neurologic: . Gait evaluation demonstrated antalgia bilaterally. patient was using walker for ambulation.   Psychiatric: oriented to person, oriented to place and oriented to time.      Results/Data    24Apr2018 12:28PM   COMP METABOLIC PANEL WITH CBCA, LIPID PANEL AND TSH (CMP,CBCA,LIPFA,TSH)   WHITE BLOOD COUNT: 8.1 K/mcL Reference Range 4.2-11.0  RED CELL COUNT: 3.62 mil/mcL Abnormal Low Reference Range 4.00-5.20  HEMOGLOBIN: 11.1 g/dl Abnormal Low Reference Range 12.0-15.5  HEMATOCRIT: 36.3 % Reference Range 36.0-46.5  MEAN CORPUSCULAR VOLUME: 100.3 fL Abnormal High Reference Range 78.0-100.0  MEAN CORPUSCULAR HEMOGLOBIN: 30.7 pg Reference Range 26.0-34.0  TRIGLYCERIDES: 119 mg/dl Reference Range <150  NON-HDL CHOLESTEROL: 157 mg/dl  CHOLESTEROL/HDL RATIO: 3.7  Reference Range <4.5  TSH: 0.982 mcUnits/mL Reference Range 0.350-5.000  GLOBULIN (CALCULATED): 3.2 g/dl Reference Range 2.0-4.0  A/G RATIO: 1.2  Reference Range 1.0-2.4  FASTING STATUS: UNKNOWN hrs  CHOLESTEROL: 216 mg/dl Abnormal High Reference Range <200  LDL CHOLESTEROL (CALCULATED): 133 mg/dl Abnormal High Reference Range <130  HDL CHOLESTEROL: 59 mg/dl Reference Range >49  BILIRUBIN TOTAL: 0.2 mg/dl Reference Range 0.2-1.0  GOT/AST: 16 Units/L Reference Range <38  GPT/ALT: 18 Units/L Reference Range <79  ALKALINE PHOSPHATASE: 58 Units/L Reference Range   TOTAL PROTEIN: 7.1 g/dl Reference Range 6.4-8.2  ALBUMIN: 3.9 g/dl Reference Range  3.6-5.1  BUN: 12 mg/dl Reference Range 6-20  CREATININE: 0.76 mg/dl Reference Range 0.51-0.95  GFR EST. AMER: >90   GFR EST.NONAFRI AMER: 86   BUN/CREATININE RATIO: 16  Reference Range 7-25  CALCIUM: 9.4 mg/dl Reference Range 8.4-10.2  SODIUM: 140 mmol/L Reference Range 135-145  POTASSIUM: 3.8 mmol/L Reference Range 3.4-5.1  CHLORIDE: 107 mmol/L Reference Range   CARBON DIOXIDE: 25 mmol/L Reference Range 21-32  ANION GAP: 12 mmol/L Reference Range 10-20  GLUCOSE: 72 mg/dl Reference Range 65-99  LYM ABS: 2.2 K/mcL Reference Range 1.0-4.0  MON ABS: 0.6 K/mcL Reference Range 0.3-0.9  EOS ABS: 0.4 K/mcL Reference Range 0.1-0.5  BASO ABS: 0.0 K/mcL Reference Range 0.0-0.3  ABSOLUTE IMMATURE GRANULOCYTES: 0.0 K/mcL Reference Range 0-0.2  FASTING STATUS: UNKNOWN hrs  LYM%: 27 %  MON%: 7 %  EOS%: 4 %  BASO%: 0 %  PERCENT IMMATURE GRANULOCYTES: 0 %  HALLIE ABS: 5.0 K/mcL Reference Range 1.8-7.7  MEAN CORPUSCULAR HGB CONC: 30.6 g/dl Abnormal Low Reference Range 32.0-36.5  RDW-CV: 13.4 % Reference Range 11.0-15.0  PLATELET COUNT: 450 K/mcL Reference Range 140-450  NRBC: 0 % Reference Range 0  DIFF TYPE: AUTOMATED DIFFERENTIAL   HALLIE%: 62 %  Immunizations  Influenza --- Series1: 01-Oct-2014; Series2: 21-Sep-2015; Series3: 27-Sep-2016; Series4:  27-Sep-2016; Series5: 06-Sep-2017   PPSV --- Series1: 05-Nov-2014   Tdap --- Series1: 11-Sep-2014     Assessment  Essential hypertension (I10)  Hyperlipidemia, unspecified (E78.5)  Vitamin D deficiency disease (E55.9)  Constipation, chronic (K59.09)  Need for influenza vaccination (Z23)  Chronic left lower quadrant pain (R10.32,G89.29)  Chronic obstructive pulmonary disease (J44.9)  Chronic lumbar radiculopathy (M54.16)    Plan  Advair Diskus 500-50 MCG/DOSE Inhalation Aerosol Powder Breath Activated;  INHALE 1 DOSE BY MOUTH TWICE DAILY. RINSE MOUTH AFTER USE  Atorvastatin Calcium 80 MG Oral Tablet (Lipitor); TAKE ONE TABLET BY MOUTH  AT BEDTIME  Diclofenac Sodium 1 %  Transdermal Gel (Voltaren); APPLY TO LOWER  EXTREMITIES, 4 GM OF GEL TO AFFECTED AREA 4 TIMES DAILY.  DO  NOT APPLY MORE THAN 16 GM DAILY TO ANY ONE AFFECTED JOINT  Diclofenac Sodium 50 MG Oral Tablet Delayed Release; TAKE 1 TABLET BY  MOUTH EVERY 12 HOURS  Gabapentin 800 MG Oral Tablet; TAKE 1 TABLET BY MOUTH 4 TIMES A DAY  Hydrocodone-Acetaminophen  MG Oral Tablet; TAKE 1 TO 2 TABLETS  EVERY 4 TO 6 HOURS FOR BREAKTHROUGH PAIN  Incruse Ellipta 62.5 MCG/INH Inhalation Aerosol Powder Breath Activated; USE 1  INHALATION ONCE DAILY  Polyethylene Glycol 3350 Oral Powder; TAKE 17GRAMS DAILY IN WATER  Propranolol HCl - 20 MG Oral Tablet; TAKE ONE TABLET BY MOUTH TWICE A  DAY  Ventolin  (90 Base) MCG/ACT Inhalation Aerosol Solution; TAKE 1 PUFF  BY MOUTH EVERY 4 HOURS AS NEEDED  COMP METABOLIC PANEL WITH CBCA, LIPID PANEL AND TSH (CMP,CBCA,LIPFA,TSH);  Status:Active; Requested for:15Oct2018;   VITAMIN D,25 HYDROXY; Status:Active; Requested for:15Oct2018;   Plans: Patient will do blood work to follow-up on hypertension, hyperlipidemia, mild anemia which was due to anemia of chronic disease  Blood pressure looks controlled  We will need to repeat vitamin D level, still taking weekly supplements  Continue MiraLAX for constipation due to chronic opioid  Prescription for hydrocodone was refilled, compliant, continue follow-up with pain clinic  Patient [facility] will request results  Left lower quadrant abdominal pain, I think most likely due to adhesions after surgery.  Do not see any acute findings on the examination.  Advised to see a surgeon for follow-up  Her colonoscopy was 2 years ago relatively normal  Only concern if patient may need CAT scan of the abdomen for evaluation  COPD stable  Flu shot was given today  Follow-up 2 months        Signatures   Electronically signed by : Yolande Calles, ; Oct 15 2018 10:27AM CST    Electronically signed by : MORAIMA  MD HARRY; Oct 15 2018 10:55AM CST

## 2020-02-25 NOTE — PATIENT DISCUSSION
Based on today’s exam and diagnostic studies, the determination was made for treatment today.  Decreased DME, Improved, Inj.

## 2020-02-25 NOTE — PROCEDURE NOTE: CLINICAL
PROCEDURE NOTE: Eylea 2mg OD. Diagnosis: Diabetic Macular Edema. Prior to injection, risks/benefits/alternatives discussed including corneal abrasion, infection, loss of vision, hemorrhage, cataract, glaucoma, retinal tears or detachment. A written consent is on file, and the need for today’s injection was discussed and the patient is understanding and wishes to proceed. The entire vial of Eylea was drawn up into a syringe. The opened vial, remaining drug, and filtered needle were disposed of in a certified biohazard container. Betadine prep was performed. Topical anesthesia was induced with Alcaine. 4% lidocaine pledge. A lid speculum was used. A short 30g needle on a 1cc syringe was used with product that that had previously been prepared under sterile conditions. Injection site: 3-4 mm from the limbus. The used syringe/needle was transferred to a biohazard container. Lid speculum removed. Mask worn during procedure. Patient tolerated procedure well. Count fingers vision was verified. There were no complications. Patient was given the standard instruction sheet. Patient given office phone number/answering service number and advised to call immediately should there be loss of vision or pain, or should they have any other questions or concerns. Pennye Hand PROCEDURE NOTE: Eylea 2mg OS. Diagnosis: Diabetic Macular Edema. Prior to injection, risks/benefits/alternatives discussed including corneal abrasion, infection, loss of vision, hemorrhage, cataract, glaucoma, retinal tears or detachment. A written consent is on file, and the need for today’s injection was discussed and the patient is understanding and wishes to proceed. The entire vial of Eylea was drawn up into a syringe. The opened vial, remaining drug, and filtered needle were disposed of in a certified biohazard container. Betadine prep was performed. Topical anesthesia was induced with Alcaine. 4% lidocaine pledge. A lid speculum was used. A short 30g needle on a 1cc syringe was used with product that that had previously been prepared under sterile conditions. Injection site: 3-4 mm from the limbus. The used syringe/needle was transferred to a biohazard container. Lid speculum removed. Mask worn during procedure. Patient tolerated procedure well. Count fingers vision was verified. There were no complications. Patient was given the standard instruction sheet. Patient given office phone number/answering service number and advised to call immediately should there be loss of vision or pain, or should they have any other questions or concerns. Jennifer Bacon

## 2020-05-19 NOTE — PROCEDURE NOTE: CLINICAL
PROCEDURE NOTE: Eylea Prefilled Syringe 2mg OD. Diagnosis: Diabetic Macular Edema. Prior to injection, risks/benefits/alternatives discussed including corneal abrasion, infection, loss of vision, hemorrhage, cataract, glaucoma, retinal tears or detachment. A written consent is on file, and the need for today's injection was discussed and the patient is understanding and wishes to proceed. A 30G needle was placed on an Eylea 2mg/0.05ml Pre-filled Syringe. Betadine prep was performed. Topical anesthesia was induced with Alcaine. 4% lidocaine pledge. A lid speculum was used. An intravitreal injection of Eylea was given. Injection site: 3-4 mm from the limbus. The used syringe/needle was transferred to a biohazard container. Lid speculum removed. Mask worn during procedure. Patient tolerated procedure well. Count fingers vision was verified. There were no complications. Patient was given the standard instruction sheet. Tono Rihcter PROCEDURE NOTE: Eylea Prefilled Syringe 2mg OS. Diagnosis: Diabetic Macular Edema. Prior to injection, risks/benefits/alternatives discussed including corneal abrasion, infection, loss of vision, hemorrhage, cataract, glaucoma, retinal tears or detachment. A written consent is on file, and the need for today's injection was discussed and the patient is understanding and wishes to proceed. A 30G needle was placed on an Eylea 2mg/0.05ml Pre-filled Syringe. Betadine prep was performed. Topical anesthesia was induced with Alcaine. 4% lidocaine pledge. A lid speculum was used. An intravitreal injection of Eylea was given. Injection site: 3-4 mm from the limbus. The used syringe/needle was transferred to a biohazard container. Lid speculum removed. Mask worn during procedure. Patient tolerated procedure well. Count fingers vision was verified. There were no complications. Patient was given the standard instruction sheet. Tono Richter

## 2020-07-07 NOTE — PATIENT DISCUSSION
Quinton, Discussed the importance of blood pressure control in the prevention of ocular complications.

## 2020-07-07 NOTE — PROCEDURE NOTE: CLINICAL
PROCEDURE NOTE: Eylea Prefilled Syringe 2mg OD. Diagnosis: Diabetic Macular Edema. Prior to injection, risks/benefits/alternatives discussed including corneal abrasion, infection, loss of vision, hemorrhage, cataract, glaucoma, retinal tears or detachment. A written consent is on file, and the need for today's injection was discussed and the patient is understanding and wishes to proceed. A 30G needle was placed on an Eylea 2mg/0.05ml Pre-filled Syringe. Betadine prep was performed. Topical anesthesia was induced with Alcaine. 4% lidocaine pledge. A lid speculum was used. An intravitreal injection of Eylea was given. Injection site: 3-4 mm from the limbus. The used syringe/needle was transferred to a biohazard container. Lid speculum removed. Mask worn during procedure. Patient tolerated procedure well. Count fingers vision was verified. There were no complications. Patient was given the standard instruction sheet. Rekha Amezcua PROCEDURE NOTE: Eylea Prefilled Syringe 2mg OS. Diagnosis: Diabetic Macular Edema. Prior to injection, risks/benefits/alternatives discussed including corneal abrasion, infection, loss of vision, hemorrhage, cataract, glaucoma, retinal tears or detachment. A written consent is on file, and the need for today's injection was discussed and the patient is understanding and wishes to proceed. A 30G needle was placed on an Eylea 2mg/0.05ml Pre-filled Syringe. Betadine prep was performed. Topical anesthesia was induced with Alcaine. 4% lidocaine pledge. A lid speculum was used. An intravitreal injection of Eylea was given. Injection site: 3-4 mm from the limbus. The used syringe/needle was transferred to a biohazard container. Lid speculum removed. Mask worn during procedure. Patient tolerated procedure well. Count fingers vision was verified. There were no complications. Patient was given the standard instruction sheet. Rekha Amezcua

## 2020-07-07 NOTE — PATIENT DISCUSSION
Active, Discussed with the patient the importance of good control of their blood sugar, blood pressure, cholesterol, diet, exercise, weight, and medication usage under the guidance of their diabetic doctor to prevent/halt diabetic retinopathy.

## 2020-10-28 NOTE — PATIENT DISCUSSION
Active, Discussed importance of strict blood sugar and blood pressure control. Department of Anesthesiology  Preprocedure Note       Name:  Megan Prescott   Age:  52 y.o.  :  1967                                          MRN:  [id number]         Date:  10/28/2020      Surgeon: Bruce Yeboah):  Franc Brewer MD    Procedure: Procedure(s):  WIDER EXCISION MELANOMA VERTEX OF SCALP    Medications prior to admission:   Prior to Admission medications    Medication Sig Start Date End Date Taking? Authorizing Provider   VITAMIN D PO Take 1 tablet by mouth daily   Yes Historical Provider, MD   mycophenolate (CELLCEPT) 250 MG capsule Take 1,000 mg by mouth 2 times daily   Yes Historical Provider, MD   Alendronate Sodium 70 MG TBEF Take by mouth once a week    Yes Historical Provider, MD   Misc Natural Products (GLUCOSAMINE CHOND COMPLEX/MSM PO) Take  by mouth. Yes Historical Provider, MD   Ascorbic Acid (VITAMIN C) 500 MG tablet Take 1,000 mg by mouth daily. Yes Historical Provider, MD   predniSONE (DELTASONE) 10 MG tablet Take 10 mg by mouth daily. Yes Historical Provider, MD   furosemide (LASIX) 40 MG tablet Take 20 mg by mouth daily    Yes Historical Provider, MD   enalapril (VASOTEC) 2.5 MG tablet Take 2.5 mg by mouth daily. Yes Historical Provider, MD   Zinc Sulfate 66 MG TABS Take 1 tablet by mouth daily    Historical Provider, MD       Current medications:    Current Facility-Administered Medications   Medication Dose Route Frequency Provider Last Rate Last Dose    0.9 % sodium chloride infusion   Intravenous Continuous Franc Brewer MD        ceFAZolin (ANCEF) 2 g in dextrose 5 % 50 mL IVPB  2 g Intravenous Once Franc Brewer MD           Allergies:  No Known Allergies    Problem List:    Patient Active Problem List   Diagnosis Code    Sprain of ligament of lumbosacral joint S33. 9XXA    Sprain of neck S13. 9XXA    Thoracic sprain and strain [id number]    CVA (cerebral vascular accident) ([facility] [address] I63.9    Renal transplant recipient Z80.0    History of non-Hodgkin's lymphoma Z85.72    Hyperlipidemia E78.5    Hypertension I10       Past Medical History:        Diagnosis Date    CVA (cerebral vascular accident) ([facility] [address]     with chemo - residual numbness in left hand    Hematoma     right leg s/p fall    Hemodialysis patient ([facility] [address]     Hx of blood clots     Arm    Hyperlipidemia     Hypertension     Non Hodgkin's lymphoma ([facility] [address]     1995/96    Renal transplant recipient     [facility] transplant follows       Past Surgical History:        Procedure Laterality Date    APPENDECTOMY      1972    BONE MARROW TRANSPLANT  1996    KIDNEY TRANSPLANT  06/06/2000       Social History:    Social History     Tobacco Use    Smoking status: Never Smoker    Smokeless tobacco: Never Used   Substance Use Topics    Alcohol use: Yes     Alcohol/week: 1.0 standard drinks     Types: 1 Shots of liquor per week     Comment: very rare use. Counseling given: Not Answered      Vital Signs (Current):   Vitals:    10/20/20 1234 10/28/20 0746   BP:  134/77   Pulse:  81   Resp:  16   Temp:  97.5 °F (36.4 °C)   TempSrc:  Temporal   SpO2:  98%   Weight: 214 lb (97.1 kg) 215 lb 9.6 oz (97.8 kg)   Height: 5' 10\" (1.778 m) 5' 10\" (1.778 m)                                              BP Readings from Last 3 Encounters:   10/28/20 134/77   02/22/18 120/60   02/05/18 119/76       NPO Status: Time of last liquid consumption: 2200                        Time of last solid consumption: 2200                        Date of last liquid consumption: 10/27/20                        Date of last solid food consumption: 10/27/20    BMI:   Wt Readings from Last 3 Encounters:   10/28/20 215 lb 9.6 oz (97.8 kg)   02/22/18 202 lb (91.6 kg)   02/05/18 203 lb (92.1 kg)     Body mass index is 30.94 kg/m².     CBC:   Lab Results   Component Value Date    WBC 7.6 09/09/2020    RBC 4.59 09/09/2020    HGB 13.7 09/09/2020    HCT 44.3 09/09/2020    MCV 96.5 09/09/2020    RDW 15.7 02/20/2018

## 2022-02-01 ENCOUNTER — NEW PATIENT (OUTPATIENT)
Dept: URBAN - NONMETROPOLITAN AREA CLINIC 5 | Facility: CLINIC | Age: 72
End: 2022-02-01

## 2022-02-01 DIAGNOSIS — D23.112: ICD-10-CM

## 2022-02-01 PROCEDURE — 92002 INTRM OPH EXAM NEW PATIENT: CPT

## 2022-02-01 PROCEDURE — 67810 INCAL BX EYELID SKN LID MRGN: CPT

## 2022-02-01 PROCEDURE — 92285 EXTERNAL OCULAR PHOTOGRAPHY: CPT

## 2022-02-01 ASSESSMENT — VISUAL ACUITY
OD_SC: 20/40
OS_SC: 20/30

## 2024-05-29 NOTE — PATIENT DISCUSSION
Continue eye medications per post op instruction sheet. Detail Level: Detailed Detail Level: Generalized

## 2024-08-01 NOTE — PATIENT DISCUSSION
Could not reach patient by phone (voicemail not set up) tried to mail new appointment date and time to patient and it was returned to the office on 08/01/2024. Patient appointment was rescheduled due to provider being out.    See NPDR OD IMP/PLN.

## 2025-01-10 NOTE — PATIENT DISCUSSION
[facility]  Internal Medicine  [id number] [place] [address]  Phone: [phone]  Fax: [phone]  Follow Up Time: 1 week